# Patient Record
Sex: MALE | Race: WHITE | NOT HISPANIC OR LATINO | Employment: FULL TIME | URBAN - METROPOLITAN AREA
[De-identification: names, ages, dates, MRNs, and addresses within clinical notes are randomized per-mention and may not be internally consistent; named-entity substitution may affect disease eponyms.]

---

## 2020-09-26 ENCOUNTER — HOSPITAL ENCOUNTER (EMERGENCY)
Facility: CLINIC | Age: 35
Discharge: HOME | End: 2020-09-26
Attending: EMERGENCY MEDICINE | Admitting: EMERGENCY MEDICINE
Payer: COMMERCIAL

## 2020-09-26 ENCOUNTER — APPOINTMENT (OUTPATIENT)
Dept: GENERAL RADIOLOGY | Facility: CLINIC | Age: 35
End: 2020-09-26
Attending: EMERGENCY MEDICINE
Payer: COMMERCIAL

## 2020-09-26 VITALS
HEART RATE: 65 BPM | SYSTOLIC BLOOD PRESSURE: 133 MMHG | TEMPERATURE: 98.7 F | DIASTOLIC BLOOD PRESSURE: 85 MMHG | OXYGEN SATURATION: 98 % | RESPIRATION RATE: 20 BRPM

## 2020-09-26 DIAGNOSIS — S61.512A LACERATION OF LEFT WRIST, INITIAL ENCOUNTER: ICD-10-CM

## 2020-09-26 PROCEDURE — 99283 EMERGENCY DEPT VISIT LOW MDM: CPT | Mod: 25

## 2020-09-26 PROCEDURE — 25000128 H RX IP 250 OP 636: Performed by: EMERGENCY MEDICINE

## 2020-09-26 PROCEDURE — 90471 IMMUNIZATION ADMIN: CPT

## 2020-09-26 PROCEDURE — 90715 TDAP VACCINE 7 YRS/> IM: CPT | Performed by: EMERGENCY MEDICINE

## 2020-09-26 PROCEDURE — 73110 X-RAY EXAM OF WRIST: CPT | Mod: LT

## 2020-09-26 RX ORDER — LIDOCAINE HYDROCHLORIDE AND EPINEPHRINE 10; 10 MG/ML; UG/ML
INJECTION, SOLUTION INFILTRATION; PERINEURAL
Status: DISCONTINUED
Start: 2020-09-26 | End: 2020-09-27 | Stop reason: HOSPADM

## 2020-09-26 RX ADMIN — CLOSTRIDIUM TETANI TOXOID ANTIGEN (FORMALDEHYDE INACTIVATED), CORYNEBACTERIUM DIPHTHERIAE TOXOID ANTIGEN (FORMALDEHYDE INACTIVATED), BORDETELLA PERTUSSIS TOXOID ANTIGEN (GLUTARALDEHYDE INACTIVATED), BORDETELLA PERTUSSIS FILAMENTOUS HEMAGGLUTININ ANTIGEN (FORMALDEHYDE INACTIVATED), BORDETELLA PERTUSSIS PERTACTIN ANTIGEN, AND BORDETELLA PERTUSSIS FIMBRIAE 2/3 ANTIGEN 0.5 ML: 5; 2; 2.5; 5; 3; 5 INJECTION, SUSPENSION INTRAMUSCULAR at 20:18

## 2020-09-26 ASSESSMENT — ENCOUNTER SYMPTOMS
SHORTNESS OF BREATH: 0
RHINORRHEA: 0
VOMITING: 0
WOUND: 1
FEVER: 0
COUGH: 0
NAUSEA: 0
NUMBNESS: 0

## 2020-09-26 NOTE — ED AVS SNAPSHOT
[facility] Emergency Department  [address]  [facility] [address]  Phone:  [phone]  Fax:  [phone]                                    Royal Mackenzie   MRN: [id number]    Department:  [facility] Emergency Department   Date of Visit:  9/26/2020           After Visit Summary Signature Page    I have received my discharge instructions, and my questions have been answered. I have discussed any challenges I see with this plan with the nurse or doctor.    ..........................................................................................................................................  Patient/Patient Representative Signature      ..........................................................................................................................................  Patient Representative Print Name and Relationship to Patient    ..................................................               ................................................  Date                                   Time    ..........................................................................................................................................  Reviewed by Signature/Title    ...................................................              ..............................................  Date                                               Time          22EPIC Rev 08/18

## 2020-09-27 NOTE — ED PROVIDER NOTES
History   Chief Complaint:  Laceration    HPI  Royal Mackenzie is a 35 year old right hand dominant male who presents for evaluation of a left wrist laceration. The patient was trying to chop something with a hatchet when he hit his wrist. He states it bled a lot at first, then he and his wife applied pressure but when they took it off there was a 3-4 inch squirt of blood. Last tetanus was 2010. He denies numbness or tingling in his hand. No fever, cough, runny nose, nausea, vomiting, or shortness of breath.     Allergies:  No Known Allergies    Medications:    Methylin    Past Medical History:    Hyperlipidemia  ADHD  Dyshidrosis    Past Surgical History:    The patient does not have any pertinent past surgical history.    Family History:    Alcohol and drug abuse  Depression    Social History:  Marital Status:   Presents with wife at bedside  Tobacco use: No  Alcohol use: No  Drug use: No    Review of Systems   Constitutional: Negative for fever.   HENT: Negative for rhinorrhea.    Respiratory: Negative for cough and shortness of breath.    Gastrointestinal: Negative for nausea and vomiting.   Skin: Positive for wound.   Neurological: Negative for numbness.   All other systems reviewed and are negative.    Physical Exam     Patient Vitals for the past 24 hrs:   BP Temp Temp src Pulse Resp SpO2   09/26/20 1918 133/85 98.7  F (37.1  C) Oral 65 20 98 %       Physical Exam  Constitutional: Well developed, nontox appearance  Head: Atraumatic.   Neck:  no stridor  Eyes: no scleral icterus  Cardiovascular: RRR, 2+ L radial and ulnar pulses  Pulmonary/Chest: nml resp effort  Ext: Warm, well perfused, no edema              RUE: 2.5 cm laceration to radial side of wrist, hemostatic  Neurological: A&O, symmetric facies, moves ext x4  Skin: Skin is warm and dry.   Psychiatric: Behavior is normal. Thought content normal.   Nursing note and vitals reviewed.    Emergency Department Course     Imaging:  Radiology  findings were communicated with the patient who voiced understanding of the findings.    XR Left Wrist 2 Views:  Soft tissue irregularity in the radial aspect of the radial styloid and proximal carpal row region. No radiopaque foreign body. No fracture or osseous volume loss. Normal joint spacing and alignment, as per radiology.      Procedures    Laceration Repair        LACERATION:  A simple clean 2.5 cm laceration.      LOCATION:  Left wrist      FUNCTION:  Distally sensation, circulation, motor and tendon function are intact.      ANESTHESIA:  Local using Lidocaine 1% w/ Epi      PREPARATION:  Irrigation and Scrubbing with Normal Saline and Shur Clens      DEBRIDEMENT:  No debridement      CLOSURE:  Wound was closed with One Layer.  Skin closed with 3 x 5.0 Ethylon using interrupted sutures.    Interventions:  2018: Adacel, 0.5 mL, Intramuscular    Emergency Department Course:  Past medical records, nursing notes, and vitals reviewed.    : I performed an exam of the patient as documented above.     The patient was sent for a XR while in the emergency department, results above.     : I performed a laceration repair which the patient tolerated well. He is comfortable with discharge.    Findings and plan explained to the Patient. Patient discharged home with instructions regarding supportive care, medications, and reasons to return. The importance of close follow-up was reviewed.     I personally reviewed the imaging results with the Patient and answered all related questions prior to discharge.     Impression & Plan     Medical Decision Makin year old male presenting w/ L wrist laceration     The patient presented with a laceration.  The wound was carefully evaluated and explored.  The laceration was closed as noted in the procedure note.  There is no evidence of muscular, tendon, bone, or nerve damage with this laceration.  Xrays negative for bony involvement.  Possible complications (infection,  scarring) were reviewed with the patient and/or written instructions were provided and reviewed by nursing staff.  At this time I feel the pt is safe for discharge.  Recommendations given regarding follow up with PCP/clinic for suture and return to the emergency department as needed for new or worsening symptoms.  Pt counseled on disposition and diagnosis.  They are understanding and agreeable to plan. Patient discharged in stable condition.      Diagnosis:    ICD-10-CM    1. Laceration of left wrist, initial encounter  S61.512A        Disposition:  Discharged to home.    Scribe Disclosure:  JANELLE, Mimi Mcclain, am serving as a scribe at 7:47 PM on 9/26/2020 to document services personally performed by Parag Feliz MD based on my observations and the provider's statements to me.      Parag Feliz MD  09/27/20 0321

## 2020-09-27 NOTE — ED TRIAGE NOTES
"Pt arrives with complaints of R wrist laceration from a hatchet while trying to chop something, he and wife report blood did \"squirt up 3-4in\" after they released the pressure being applied. Dressing will remain in place at this time. ABCs intact, CMS intact, pt does not think he is UTD on tetanus.   "

## 2020-09-27 NOTE — DISCHARGE INSTRUCTIONS
1. Do not get laceration wet for 24 to 48 hours.  After, you may wash gently with warm soapy water.  Do not submerge laceration (ex. Bath tub, pool) until the sutures are removed.  2. You may use ice as needed for swelling.  3. -Take acetaminophen 500 to 1000 mg by mouth every 4 to 6 hours as needed for pain or fever.  Do not take more than 4000 mg in 24 hours.  Do not take within 6 hours of another acetaminophen containing medication such as norco (vicodin) or percocet.  - Take ibuprofen 600 to 800 mg by mouth every 6 to 8 hours as needed for pain or fever  4. Please follow-up in clinic in 10 days for suture removal. There were 3 sutures placed.  5. Please return to the ED as needed for new or worsening symptoms such as redness to surrounding tissue, draining pus, fever, multiple episodes of vomiting, any other concerning symptoms.    Please apply high SPF sunscreen (50 or higher) to laceration for 3-6 months after healing to help prevent scar formation.

## 2021-06-02 ENCOUNTER — RECORDS - HEALTHEAST (OUTPATIENT)
Dept: ADMINISTRATIVE | Facility: CLINIC | Age: 36
End: 2021-06-02

## 2024-06-28 ENCOUNTER — HOSPITAL ENCOUNTER (EMERGENCY)
Facility: CLINIC | Age: 39
Discharge: HOME | End: 2024-06-28
Attending: EMERGENCY MEDICINE | Admitting: EMERGENCY MEDICINE
Payer: COMMERCIAL

## 2024-06-28 VITALS
WEIGHT: 225.97 LBS | HEART RATE: 65 BPM | DIASTOLIC BLOOD PRESSURE: 68 MMHG | OXYGEN SATURATION: 96 % | RESPIRATION RATE: 18 BRPM | HEIGHT: 72 IN | SYSTOLIC BLOOD PRESSURE: 124 MMHG | BODY MASS INDEX: 30.61 KG/M2 | TEMPERATURE: 97 F

## 2024-06-28 DIAGNOSIS — R20.2 PARESTHESIAS: ICD-10-CM

## 2024-06-28 DIAGNOSIS — B02.9 HERPES ZOSTER WITHOUT COMPLICATION: ICD-10-CM

## 2024-06-28 LAB
ANION GAP SERPL CALCULATED.3IONS-SCNC: 13 MMOL/L (ref 7–15)
BASOPHILS # BLD AUTO: 0 10E3/UL (ref 0–0.2)
BASOPHILS NFR BLD AUTO: 1 %
BUN SERPL-MCNC: 16.5 MG/DL (ref 6–20)
CALCIUM SERPL-MCNC: 9.2 MG/DL (ref 8.6–10)
CHLORIDE SERPL-SCNC: 106 MMOL/L (ref 98–107)
CREAT SERPL-MCNC: 1 MG/DL (ref 0.67–1.17)
DEPRECATED HCO3 PLAS-SCNC: 22 MMOL/L (ref 22–29)
EGFRCR SERPLBLD CKD-EPI 2021: >90 ML/MIN/1.73M2
EOSINOPHIL # BLD AUTO: 0.1 10E3/UL (ref 0–0.7)
EOSINOPHIL NFR BLD AUTO: 2 %
ERYTHROCYTE [DISTWIDTH] IN BLOOD BY AUTOMATED COUNT: 13.2 % (ref 10–15)
GLUCOSE SERPL-MCNC: 98 MG/DL (ref 70–99)
HCT VFR BLD AUTO: 44.2 % (ref 40–53)
HGB BLD-MCNC: 14.3 G/DL (ref 13.3–17.7)
HOLD SPECIMEN: NORMAL
IMM GRANULOCYTES # BLD: 0 10E3/UL
IMM GRANULOCYTES NFR BLD: 1 %
LYMPHOCYTES # BLD AUTO: 1.9 10E3/UL (ref 0.8–5.3)
LYMPHOCYTES NFR BLD AUTO: 31 %
MCH RBC QN AUTO: 26.6 PG (ref 26.5–33)
MCHC RBC AUTO-ENTMCNC: 32.4 G/DL (ref 31.5–36.5)
MCV RBC AUTO: 82 FL (ref 78–100)
MONOCYTES # BLD AUTO: 0.5 10E3/UL (ref 0–1.3)
MONOCYTES NFR BLD AUTO: 8 %
NEUTROPHILS # BLD AUTO: 3.5 10E3/UL (ref 1.6–8.3)
NEUTROPHILS NFR BLD AUTO: 58 %
NRBC # BLD AUTO: 0 10E3/UL
NRBC BLD AUTO-RTO: 0 /100
PLATELET # BLD AUTO: 295 10E3/UL (ref 150–450)
POTASSIUM SERPL-SCNC: 4 MMOL/L (ref 3.4–5.3)
RBC # BLD AUTO: 5.37 10E6/UL (ref 4.4–5.9)
SODIUM SERPL-SCNC: 141 MMOL/L (ref 135–145)
WBC # BLD AUTO: 6.1 10E3/UL (ref 4–11)

## 2024-06-28 PROCEDURE — 99283 EMERGENCY DEPT VISIT LOW MDM: CPT

## 2024-06-28 PROCEDURE — 85041 AUTOMATED RBC COUNT: CPT | Performed by: EMERGENCY MEDICINE

## 2024-06-28 PROCEDURE — 36415 COLL VENOUS BLD VENIPUNCTURE: CPT | Performed by: EMERGENCY MEDICINE

## 2024-06-28 PROCEDURE — 250N000013 HC RX MED GY IP 250 OP 250 PS 637: Performed by: EMERGENCY MEDICINE

## 2024-06-28 PROCEDURE — 80048 BASIC METABOLIC PNL TOTAL CA: CPT | Performed by: EMERGENCY MEDICINE

## 2024-06-28 RX ORDER — VALACYCLOVIR HYDROCHLORIDE 1 G/1
1000 TABLET, FILM COATED ORAL ONCE
Status: COMPLETED | OUTPATIENT
Start: 2024-06-28 | End: 2024-06-28

## 2024-06-28 RX ORDER — VALACYCLOVIR HYDROCHLORIDE 1 G/1
1000 TABLET, FILM COATED ORAL 3 TIMES DAILY
Qty: 21 TABLET | Refills: 0 | Status: SHIPPED | OUTPATIENT
Start: 2024-06-28 | End: 2024-07-05

## 2024-06-28 RX ADMIN — VALACYCLOVIR HYDROCHLORIDE 1000 MG: 1 TABLET, FILM COATED ORAL at 08:09

## 2024-06-28 ASSESSMENT — COLUMBIA-SUICIDE SEVERITY RATING SCALE - C-SSRS
6. HAVE YOU EVER DONE ANYTHING, STARTED TO DO ANYTHING, OR PREPARED TO DO ANYTHING TO END YOUR LIFE?: NO
2. HAVE YOU ACTUALLY HAD ANY THOUGHTS OF KILLING YOURSELF IN THE PAST MONTH?: NO
1. IN THE PAST MONTH, HAVE YOU WISHED YOU WERE DEAD OR WISHED YOU COULD GO TO SLEEP AND NOT WAKE UP?: NO

## 2024-06-28 ASSESSMENT — ACTIVITIES OF DAILY LIVING (ADL): ADLS_ACUITY_SCORE: 35

## 2024-06-28 NOTE — ED PROVIDER NOTES
Emergency Department Note      History of Present Illness     Chief Complaint   Numbness      HPI     Royal Mackenzie is a 39 year old male who presents for evaluation of numbness around the upper left quadrant of the abdomen and left side of the mid back. The patient states onset began 2 days ago after waking up in the morning accompanied by itching in the same areas. He states that he's never experienced this before. During evaluation the patient endorses a little erythema around the left side of the abdomen and back but he believes that's from itching. He denies endorsing a rash, taking medications regularly, and other medical problems.     Independent Historian   None    Review of External Notes   None    Past Medical History     Medical History and Problem List   ADHD  Hyperlipidemia    Medications   Adderall      Physical Exam     Patient Vitals for the past 24 hrs:   BP Temp Temp src Pulse Resp SpO2 Height Weight   06/28/24 0711 124/68 97  F (36.1  C) Temporal 65 18 96 % 1.829 m (6') 102.5 kg (225 lb 15.5 oz)     Physical Exam  Skin:            Comments: Distribution of paresthesias with associated scattered erythematous macules and papules without do vesicular formation.           HEENT:    Oropharynx is moist, without lesions or trismus.  Eyes:    Conjunctiva normal     PERRL     EOMs intact  Neck:     Supple, no meningismus.     CV:     Regular rate and rhythm.      No murmurs, rubs or gallops.    PULM:    Clear to auscultation bilateral.       No respiratory distress.      Good air exchange.     No rales or wheezing.     No stridor.  ABD:    Soft, non-tender, non-distended.       No pulsatile masses.       No rebound, guarding or rigidity.  MSK:     No gross deformity to all four extremities.   LYMPH:   No cervical lymphadenopathy.  NEURO:   Alert & O x 3   CN II-XII intact, speech is clear with no aphasia.     Finger to nose within normal limits.  No pronator drift.     Strength is 5/5 in all 4  extremities.     Normal muscular tone, no tremor.  Skin:    Warm, dry   Psych:    Mood is good and affect is appropriate.      Diagnostics     Lab Results   Labs Ordered and Resulted from Time of ED Arrival to Time of ED Departure   BASIC METABOLIC PANEL - Normal       Result Value    Sodium 141      Potassium 4.0      Chloride 106      Carbon Dioxide (CO2) 22      Anion Gap 13      Urea Nitrogen 16.5      Creatinine 1.00      GFR Estimate >90      Calcium 9.2      Glucose 98     CBC WITH PLATELETS AND DIFFERENTIAL    WBC Count 6.1      RBC Count 5.37      Hemoglobin 14.3      Hematocrit 44.2      MCV 82      MCH 26.6      MCHC 32.4      RDW 13.2      Platelet Count 295      % Neutrophils 58      % Lymphocytes 31      % Monocytes 8      % Eosinophils 2      % Basophils 1      % Immature Granulocytes 1      NRBCs per 100 WBC 0      Absolute Neutrophils 3.5      Absolute Lymphocytes 1.9      Absolute Monocytes 0.5      Absolute Eosinophils 0.1      Absolute Basophils 0.0      Absolute Immature Granulocytes 0.0      Absolute NRBCs 0.0         Independent Interpretation   None    ED Course      Medications Administered   Medications   valACYclovir (VALTREX) tablet 1,000 mg (1,000 mg Oral $Given 6/28/24 0809)       Procedures   Procedures     Discussion of Management   None    Social Determinants of Health adding to complexity of care   None    ED Course   ED Course as of 06/28/24 0936   Fri Jun 28, 2024   0724 I obtained history and performed physical exam as noted above.        Medical Decision Making / Diagnosis     CMS Diagnoses: None    MIPS       None    [facility]     Royal Mackenzie is a 39 year old male presents with an area of paresthesias and pruritus and a left T9-10 distribution.  He has a few developing erythematous macules and papules.  Evaluation is most concerning for early developing herpes zoster.  Labs are otherwise unremarkable.  Patient initiated on valacyclovir and made aware that he will likely develop  further lesions in a dermatomal distribution.  Patient safe for discharge home with valacyclovir and return to the ED for any worsening symptoms.    Disposition   The patient was discharged.     Diagnosis     ICD-10-CM    1. Paresthesias  R20.2       2. Herpes zoster without complication  B02.9            Discharge Medications   Discharge Medication List as of 6/28/2024  8:20 AM        START taking these medications    Details   valACYclovir (VALTREX) 1000 mg tablet Take 1 tablet (1,000 mg) by mouth 3 times daily for 7 days, Disp-21 tablet, R-0, E-Prescribe               Scribe Disclosure:  Morenita LUIS, brenna serving as a scribe at 7:39 AM on 6/28/2024 to document services personally performed by Oren Gomez MD based on my observations and the provider's statements to me.        Oren Gomez MD  06/28/24 1300

## 2024-06-28 NOTE — ED TRIAGE NOTES
Pt presents to the ED stating he has numbness to his LUQ for 2 days that radiates to his back. Pt states his left back is numb too. Pt states when he presses on the area that is numb he feels a bump. Pt denies pain, but he states there is discomfort 3/10.

## 2025-02-04 ENCOUNTER — APPOINTMENT (OUTPATIENT)
Dept: URBAN - METROPOLITAN AREA CLINIC 253 | Age: 40
Setting detail: DERMATOLOGY
End: 2025-02-04

## 2025-02-04 VITALS — HEIGHT: 72 IN | WEIGHT: 230 LBS | RESPIRATION RATE: 14 BRPM

## 2025-02-04 DIAGNOSIS — B07.8 OTHER VIRAL WARTS: ICD-10-CM

## 2025-02-04 PROCEDURE — OTHER ADDITIONAL NOTES: OTHER

## 2025-02-04 PROCEDURE — 17110 DESTRUCT B9 LESION 1-14: CPT

## 2025-02-04 PROCEDURE — OTHER BENIGN DESTRUCTION: OTHER

## 2025-02-04 PROCEDURE — OTHER MIPS QUALITY: OTHER

## 2025-02-04 PROCEDURE — OTHER COUNSELING: OTHER

## 2025-02-04 ASSESSMENT — LOCATION SIMPLE DESCRIPTION DERM: LOCATION SIMPLE: RIGHT PLANTAR SURFACE

## 2025-02-04 ASSESSMENT — LOCATION DETAILED DESCRIPTION DERM: LOCATION DETAILED: RIGHT PLANTAR FOREFOOT OVERLYING 3RD METATARSAL

## 2025-02-04 ASSESSMENT — LOCATION ZONE DERM: LOCATION ZONE: FEET

## 2025-02-04 NOTE — PROCEDURE: BENIGN DESTRUCTION
Detail Level: Detailed
Add 52 Modifier (Optional): no
Treatment Number (Will Not Render If 0): 0
Medical Necessity Information: It is in your best interest to select a reason for this procedure from the list below. All of these items fulfill various CMS LCD requirements except the new and changing color options.
Medical Necessity Clause: This procedure was medically necessary because the lesions that were treated were:
Render Post-Care Instructions In Note?: yes
Post-Care Instructions: I reviewed with the patient in detail post-care instructions. Patient is to wear sunprotection, and avoid picking at any of the treated lesions. Pt may apply Vaseline to crusted or scabbing areas.
Consent: The patient's consent was obtained including but not limited to risks of crusting, scabbing, blistering, scarring, darker or lighter pigmentary change, recurrence, incomplete removal and infection.

## 2025-02-04 NOTE — HPI: WARTS (VERRUCA)
How Severe Are Your Warts?: moderate
Is This A New Presentation, Or A Follow-Up?: Wart
Additional History: He has tried OTC cryotherapy and salicylic acid without improvement.

## 2025-02-04 NOTE — PROCEDURE: ADDITIONAL NOTES
Detail Level: Simple
Render Risk Assessment In Note?: no
Additional Notes: Instructed the patient to wash off canthacur in 1 hour.